# Patient Record
Sex: FEMALE | Race: WHITE | URBAN - METROPOLITAN AREA
[De-identification: names, ages, dates, MRNs, and addresses within clinical notes are randomized per-mention and may not be internally consistent; named-entity substitution may affect disease eponyms.]

---

## 2022-04-10 ENCOUNTER — HISTORICAL (OUTPATIENT)
Dept: ADMINISTRATIVE | Facility: HOSPITAL | Age: 32
End: 2022-04-10

## 2022-04-28 VITALS
BODY MASS INDEX: 22.89 KG/M2 | SYSTOLIC BLOOD PRESSURE: 131 MMHG | HEIGHT: 65 IN | DIASTOLIC BLOOD PRESSURE: 81 MMHG | OXYGEN SATURATION: 99 % | WEIGHT: 137.38 LBS

## 2024-11-29 ENCOUNTER — HOSPITAL ENCOUNTER (EMERGENCY)
Facility: HOSPITAL | Age: 34
Discharge: HOME | End: 2024-11-29
Attending: PEDIATRICS
Payer: COMMERCIAL

## 2024-11-29 VITALS
WEIGHT: 147 LBS | DIASTOLIC BLOOD PRESSURE: 78 MMHG | TEMPERATURE: 98 F | HEART RATE: 101 BPM | OXYGEN SATURATION: 99 % | RESPIRATION RATE: 16 BRPM | BODY MASS INDEX: 24.49 KG/M2 | SYSTOLIC BLOOD PRESSURE: 140 MMHG | HEIGHT: 65 IN

## 2024-11-29 DIAGNOSIS — O20.9 VAGINAL BLEEDING AFFECTING EARLY PREGNANCY: Primary | ICD-10-CM

## 2024-11-29 DIAGNOSIS — N30.00 ACUTE CYSTITIS WITHOUT HEMATURIA: ICD-10-CM

## 2024-11-29 LAB
ABORH RETYPE: NORMAL
ALBUMIN SERPL-MCNC: 4 G/DL (ref 3.5–5)
ALBUMIN/GLOB SERPL: 1.2 RATIO (ref 1.1–2)
ALP SERPL-CCNC: 67 UNIT/L (ref 40–150)
ALT SERPL-CCNC: 14 UNIT/L (ref 0–55)
ANION GAP SERPL CALC-SCNC: 13 MEQ/L
AST SERPL-CCNC: 13 UNIT/L (ref 5–34)
B-HCG FREE SERPL-ACNC: ABNORMAL MIU/ML
B-HCG UR QL: POSITIVE
BACTERIA #/AREA URNS AUTO: ABNORMAL /HPF
BASOPHILS # BLD AUTO: 0.06 X10(3)/MCL
BASOPHILS NFR BLD AUTO: 0.6 %
BILIRUB SERPL-MCNC: 0.3 MG/DL
BILIRUB UR QL STRIP.AUTO: NEGATIVE
BUN SERPL-MCNC: 8.7 MG/DL (ref 7–18.7)
CALCIUM SERPL-MCNC: 11.3 MG/DL (ref 8.4–10.2)
CAOX CRY UR QL COMP ASSIST: ABNORMAL
CHLORIDE SERPL-SCNC: 106 MMOL/L (ref 98–107)
CLARITY UR: CLEAR
CO2 SERPL-SCNC: 20 MMOL/L (ref 22–29)
COLOR UR AUTO: ABNORMAL
CREAT SERPL-MCNC: 0.81 MG/DL (ref 0.55–1.02)
CREAT/UREA NIT SERPL: 11
EOSINOPHIL # BLD AUTO: 0.24 X10(3)/MCL (ref 0–0.9)
EOSINOPHIL NFR BLD AUTO: 2.3 %
ERYTHROCYTE [DISTWIDTH] IN BLOOD BY AUTOMATED COUNT: 12.4 % (ref 11.5–17)
GFR SERPLBLD CREATININE-BSD FMLA CKD-EPI: >60 ML/MIN/1.73/M2
GLOBULIN SER-MCNC: 3.3 GM/DL (ref 2.4–3.5)
GLUCOSE SERPL-MCNC: 90 MG/DL (ref 74–100)
GLUCOSE UR QL STRIP: NORMAL
GROUP & RH: NORMAL
HCT VFR BLD AUTO: 38.9 % (ref 37–47)
HGB BLD-MCNC: 13.4 G/DL (ref 12–16)
HGB UR QL STRIP: ABNORMAL
IMM GRANULOCYTES # BLD AUTO: 0.04 X10(3)/MCL (ref 0–0.04)
IMM GRANULOCYTES NFR BLD AUTO: 0.4 %
KETONES UR QL STRIP: NEGATIVE
LEUKOCYTE ESTERASE UR QL STRIP: 25
LYMPHOCYTES # BLD AUTO: 2.18 X10(3)/MCL (ref 0.6–4.6)
LYMPHOCYTES NFR BLD AUTO: 21.2 %
MCH RBC QN AUTO: 31.4 PG (ref 27–31)
MCHC RBC AUTO-ENTMCNC: 34.4 G/DL (ref 33–36)
MCV RBC AUTO: 91.1 FL (ref 80–94)
MONOCYTES # BLD AUTO: 0.74 X10(3)/MCL (ref 0.1–1.3)
MONOCYTES NFR BLD AUTO: 7.2 %
MUCOUS THREADS URNS QL MICRO: ABNORMAL /LPF
NEUTROPHILS # BLD AUTO: 7 X10(3)/MCL (ref 2.1–9.2)
NEUTROPHILS NFR BLD AUTO: 68.3 %
NITRITE UR QL STRIP: NEGATIVE
NRBC BLD AUTO-RTO: 0 %
PH UR STRIP: 5.5 [PH]
PLATELET # BLD AUTO: 254 X10(3)/MCL (ref 130–400)
PMV BLD AUTO: 10.2 FL (ref 7.4–10.4)
POTASSIUM SERPL-SCNC: 3.9 MMOL/L (ref 3.5–5.1)
PROT SERPL-MCNC: 7.3 GM/DL (ref 6.4–8.3)
PROT UR QL STRIP: ABNORMAL
RBC # BLD AUTO: 4.27 X10(6)/MCL (ref 4.2–5.4)
RBC #/AREA URNS AUTO: ABNORMAL /HPF
SODIUM SERPL-SCNC: 139 MMOL/L (ref 136–145)
SP GR UR STRIP.AUTO: 1.02 (ref 1–1.03)
SQUAMOUS #/AREA URNS LPF: ABNORMAL /HPF
UROBILINOGEN UR STRIP-ACNC: NORMAL
WBC # BLD AUTO: 10.26 X10(3)/MCL (ref 4.5–11.5)
WBC #/AREA URNS AUTO: ABNORMAL /HPF

## 2024-11-29 PROCEDURE — 80053 COMPREHEN METABOLIC PANEL: CPT | Performed by: NURSE PRACTITIONER

## 2024-11-29 PROCEDURE — 63600519 RHOGAM PHARM REV CODE 636 ALT 250 W HCPCS

## 2024-11-29 PROCEDURE — 99284 EMERGENCY DEPT VISIT MOD MDM: CPT | Mod: 25

## 2024-11-29 PROCEDURE — 81015 MICROSCOPIC EXAM OF URINE: CPT | Performed by: NURSE PRACTITIONER

## 2024-11-29 PROCEDURE — 96372 THER/PROPH/DIAG INJ SC/IM: CPT

## 2024-11-29 PROCEDURE — 84702 CHORIONIC GONADOTROPIN TEST: CPT | Performed by: NURSE PRACTITIONER

## 2024-11-29 PROCEDURE — 81025 URINE PREGNANCY TEST: CPT | Performed by: NURSE PRACTITIONER

## 2024-11-29 PROCEDURE — 85025 COMPLETE CBC W/AUTO DIFF WBC: CPT | Performed by: NURSE PRACTITIONER

## 2024-11-29 PROCEDURE — 86900 BLOOD TYPING SEROLOGIC ABO: CPT | Performed by: NURSE PRACTITIONER

## 2024-11-29 RX ORDER — CEPHALEXIN 500 MG/1
500 CAPSULE ORAL EVERY 6 HOURS
Qty: 12 CAPSULE | Refills: 0 | Status: SHIPPED | OUTPATIENT
Start: 2024-11-29 | End: 2024-12-02

## 2024-11-29 RX ADMIN — HUMAN RHO(D) IMMUNE GLOBULIN 300 MCG: 1500 SOLUTION INTRAMUSCULAR; INTRAVENOUS at 05:11

## 2024-11-29 NOTE — ED PROVIDER NOTES
Encounter Date: 11/29/2024       History     Chief Complaint   Patient presents with    Vaginal Bleeding     Pt. C/o vaginal bleeding started x1 hour ago with left lower abd pain.. reports 5 weeks pregnant and first pregnancy for her.      See MDM        Review of patient's allergies indicates:   Allergen Reactions    Penicillins Rash     No past medical history on file.  No past surgical history on file.  No family history on file.     Review of Systems   Genitourinary:  Positive for vaginal bleeding.   All other systems reviewed and are negative.      Physical Exam     Initial Vitals [11/29/24 1419]   BP Pulse Resp Temp SpO2   132/81 101 18 98.2 °F (36.8 °C) 99 %      MAP       --         Physical Exam    Nursing note and vitals reviewed.  Constitutional: She appears well-developed and well-nourished.   HENT:   Head: Normocephalic and atraumatic.   Eyes: EOM are normal.   Neck:   Normal range of motion.  Cardiovascular:  Normal rate and regular rhythm.           Pulmonary/Chest: Breath sounds normal.   Abdominal: Abdomen is soft. Bowel sounds are normal. There is no abdominal tenderness.   Genitourinary:    Genitourinary Comments:  deferred      Musculoskeletal:         General: Normal range of motion.      Cervical back: Normal range of motion.     Neurological: She is alert and oriented to person, place, and time.   Skin: Skin is warm and dry.   Psychiatric: She has a normal mood and affect.         ED Course   Procedures  Labs Reviewed   COMPREHENSIVE METABOLIC PANEL - Abnormal       Result Value    Sodium 139      Potassium 3.9      Chloride 106      CO2 20 (*)     Glucose 90      Blood Urea Nitrogen 8.7      Creatinine 0.81      Calcium 11.3 (*)     Protein Total 7.3      Albumin 4.0      Globulin 3.3      Albumin/Globulin Ratio 1.2      Bilirubin Total 0.3      ALP 67      ALT 14      AST 13      eGFR >60      Anion Gap 13.0      BUN/Creatinine Ratio 11     URINALYSIS, REFLEX TO URINE CULTURE - Abnormal     Color, UA Light-Yellow      Appearance, UA Clear      Specific Gravity, UA 1.016      pH, UA 5.5      Protein, UA Trace (*)     Glucose, UA Normal      Ketones, UA Negative      Blood, UA 3+ (*)     Bilirubin, UA Negative      Urobilinogen, UA Normal      Nitrites, UA Negative      Leukocyte Esterase, UA 25 (*)     RBC, UA 50-99 (*)     WBC, UA 0-5      Bacteria, UA None Seen      Squamous Epithelial Cells, UA Trace      Mucous, UA Occasional (*)     Calcium Oxalate Crystals, UA Trace (*)    HCG QUALITATIVE URINE - Abnormal    hCG Qualitative, Urine Positive (*)    HCG, QUANTITATIVE - Abnormal    Beta HCG Quant 15,151.78 (*)    CBC WITH DIFFERENTIAL - Abnormal    WBC 10.26      RBC 4.27      Hgb 13.4      Hct 38.9      MCV 91.1      MCH 31.4 (*)     MCHC 34.4      RDW 12.4      Platelet 254      MPV 10.2      Neut % 68.3      Lymph % 21.2      Mono % 7.2      Eos % 2.3      Basophil % 0.6      Lymph # 2.18      Neut # 7.00      Mono # 0.74      Eos # 0.24      Baso # 0.06      IG# 0.04      IG% 0.4      NRBC% 0.0     CBC W/ AUTO DIFFERENTIAL    Narrative:     The following orders were created for panel order CBC Auto Differential.  Procedure                               Abnormality         Status                     ---------                               -----------         ------                     CBC with Differential[[id number]]       Abnormal            Final result                 Please view results for these tests on the individual orders.   GROUP & RH    Group & Rh O NEG     ABORH RETYPE    ABORH Retype O NEG     PREPARE RH IMMUNE GLOBULIN          Imaging Results              US OB <14 Wks TransAbd & TransVag, Single Gestation (XPD) (Final result)  Result time 11/29/24 16:59:46      Final result by Helder Subramanian MD (11/29/24 16:59:46)                   Impression:      Early twin pregnancy consisting of 2 gestational sacs, but no clear fetal poles at this time.  Recommend correlation with beta HCG  "level with follow-up pelvic ultrasound as needed.      Electronically signed by: Helder Subramanian  Date:    2024  Time:    16:59               Narrative:    EXAMINATION:  US OB <14 WEEKS, TRANSABDOM & TRANSVAG, SINGLE GESTATION (XPD)    CLINICAL HISTORY:  vaginal bleeding;    TECHNIQUE:  Transabdominal and endovaginal ultrasound of the pelvis.    COMPARISON:  No relevant comparison studies available at the time of dictation    FINDINGS:  Two intrauterine gestational sacs identified both containing yolk sacs.  Sac A has mean diameter 6.4 mm for average ultrasound age 5 weeks 1 day +/-3 days with JOAQUIN 2025.  Sac B has mean diameter 9 mm with average ultrasound age 5 weeks 3 days +/-3 days and JOAQUIN 2025.  No clear fetal pole in either gestational sac.    Normal left ovary.  Right ovary not visualized.    No significant pelvic ascites.    Measurements:    Uterus: 9.0 x 5.7 x 5.2 cm    Right ovary: Not seen    Left ovary: 2.5 x 1.3 x 2.3 cm                                       Medications   rho(d) immune globulin injection 300 mcg (300 mcg Intramuscular Given 24 1726)     Medical Decision Making  This is a 33 year female  approximately 5 weeks who presents to the emergency department with her boyfriend complaining of vaginal bleeding.  Patient states that she started experiencing vaginal bleeding during intercourse approximately 1 hour prior to arrival.  Patient states that it was "a lot of blood" and that she "put on period underwear which are not soaked through." She denies any fever, abdominal pain, nausea, vomiting, dysuria, hematuria, or any other symptoms at this time.  Her OBGYN is Dr. Kilpatrick.   Although triage note states lower abdominal pain, patient not endorsing this during my exam with her.    Physical exam WNL.    CMP with no findings.  CBC without signs of acute infection.  Beta hCG 15,151. UPT positive.  UA with 3+ blood, 25 leuks.  Type and screen O negative .  Ultrasound " "impression: Early twin pregnancy consisting of 2 gestational sacs, but no clear fetal poles at this time.  Recommend correlation with beta HCG level with follow-up pelvic ultrasound as needed.    Patient informed of laboratory and imaging findings.  She was given RhoGAM injection while in the ER.  Advised close follow up with her OBGYN.  We will send 3 days of Keflex for UTI.  Patient verbalized understanding and agreement with plan.  Discharge instructions and return precautions provided.      Amount and/or Complexity of Data Reviewed  Independent Historian: friend     Details: This is a 33 year female  approximately 5 weeks who presents to the emergency department with her boyfriend complaining of vaginal bleeding.  Patient states that she started experiencing vaginal bleeding during intercourse approximately 1 hour prior to arrival.  Patient states that it was "a lot of blood" and that she "put on period underwear which are not soaked through." She denies any fever, abdominal pain, nausea, vomiting, dysuria, hematuria, or any other symptoms at this time.  Her OBGYN is Dr. Kilpatrick.   Labs: ordered. Decision-making details documented in ED Course.  Radiology: ordered. Decision-making details documented in ED Course.    Risk  Prescription drug management.      Additional MDM:   Differential Diagnosis:   Symptom: Abdominal pain. <> The follow diagnoses were considered and will be evaluated: Impending , Pregnancy, Threatened  and Urinary Tract Infection.                                     Clinical Impression:  Final diagnoses:  [O20.9] Vaginal bleeding affecting early pregnancy (Primary)  [N30.00] Acute cystitis without hematuria          ED Disposition Condition    Discharge Stable          ED Prescriptions       Medication Sig Dispense Start Date End Date Auth. Provider    cephALEXin (KEFLEX) 500 MG capsule Take 1 capsule (500 mg total) by mouth every 6 (six) hours. for 3 days 12 capsule " 11/29/2024 12/2/2024 Cielo Dobson PA-C          Follow-up Information       Follow up With Specialties Details Why Contact Info    Sri Kilpatrick MD Obstetrics and Gynecology Schedule an appointment as soon as possible for a visit in 1 week for follow up [address] [place] [address] [address]  [facility] [address]  [phone]               Cielo Dobson PA-C  11/29/24 2016

## 2024-11-29 NOTE — DISCHARGE INSTRUCTIONS
Follow up with your OBGYN within a week.  Take your antibiotics as prescribed.  If you experience any new or worsening symptoms return to the emergency department.

## 2024-11-29 NOTE — FIRST PROVIDER EVALUATION
"Medical screening examination initiated.  I have conducted a focused provider triage encounter, findings are as follows:    Brief history of present illness:  Patient states that she is 5 weeks pregnant and is having vaginal bleeding and abdominal pain.     Vitals:    11/29/24 1419   BP: 132/81   Pulse: 101   Resp: 18   Temp: 98.2 °F (36.8 °C)   SpO2: 99%   Weight: 66.7 kg (147 lb)   Height: 5' 5" (1.651 m)       Pertinent physical exam:  Awake, alert, ambulatory      Brief workup plan:  Labs, Imaging    Preliminary workup initiated; this workup will be continued and followed by the physician or advanced practice provider that is assigned to the patient when roomed.  "